# Patient Record
Sex: FEMALE
[De-identification: names, ages, dates, MRNs, and addresses within clinical notes are randomized per-mention and may not be internally consistent; named-entity substitution may affect disease eponyms.]

---

## 2020-05-16 ENCOUNTER — TRANSCRIPTION ENCOUNTER (OUTPATIENT)
Age: 35
End: 2020-05-16

## 2020-05-16 ENCOUNTER — INPATIENT (INPATIENT)
Facility: HOSPITAL | Age: 35
LOS: 0 days | Discharge: HOME | End: 2020-05-17
Attending: OBSTETRICS & GYNECOLOGY | Admitting: OBSTETRICS & GYNECOLOGY

## 2020-05-16 VITALS
DIASTOLIC BLOOD PRESSURE: 79 MMHG | SYSTOLIC BLOOD PRESSURE: 130 MMHG | HEART RATE: 116 BPM | RESPIRATION RATE: 20 BRPM | TEMPERATURE: 98 F

## 2020-05-16 DIAGNOSIS — O26.899 OTHER SPECIFIED PREGNANCY RELATED CONDITIONS, UNSPECIFIED TRIMESTER: ICD-10-CM

## 2020-05-16 DIAGNOSIS — Z3A.00 WEEKS OF GESTATION OF PREGNANCY NOT SPECIFIED: ICD-10-CM

## 2020-05-16 LAB
BLD GP AB SCN SERPL QL: NEGATIVE — SIGNIFICANT CHANGE UP
HCT VFR BLD CALC: 35.2 % — SIGNIFICANT CHANGE UP (ref 34.5–45)
HGB BLD-MCNC: 11.5 G/DL — SIGNIFICANT CHANGE UP (ref 11.5–15.5)
MCHC RBC-ENTMCNC: 28.6 PG — SIGNIFICANT CHANGE UP (ref 27–34)
MCHC RBC-ENTMCNC: 32.7 % — SIGNIFICANT CHANGE UP (ref 32–36)
MCV RBC AUTO: 87.6 FL — SIGNIFICANT CHANGE UP (ref 80–100)
NRBC # FLD: 0 K/UL — SIGNIFICANT CHANGE UP (ref 0–0)
PLATELET # BLD AUTO: 241 K/UL — SIGNIFICANT CHANGE UP (ref 150–400)
PMV BLD: 9.3 FL — SIGNIFICANT CHANGE UP (ref 7–13)
RBC # BLD: 4.02 M/UL — SIGNIFICANT CHANGE UP (ref 3.8–5.2)
RBC # FLD: 14.3 % — SIGNIFICANT CHANGE UP (ref 10.3–14.5)
RH IG SCN BLD-IMP: POSITIVE — SIGNIFICANT CHANGE UP
SARS-COV-2 RNA SPEC QL NAA+PROBE: SIGNIFICANT CHANGE UP
T PALLIDUM AB TITR SER: NEGATIVE — SIGNIFICANT CHANGE UP
WBC # BLD: 34.23 K/UL — HIGH (ref 3.8–10.5)
WBC # FLD AUTO: 34.23 K/UL — HIGH (ref 3.8–10.5)

## 2020-05-16 RX ORDER — MAGNESIUM HYDROXIDE 400 MG/1
30 TABLET, CHEWABLE ORAL
Refills: 0 | Status: DISCONTINUED | OUTPATIENT
Start: 2020-05-16 | End: 2020-05-17

## 2020-05-16 RX ORDER — OXYTOCIN 10 UNIT/ML
333.33 VIAL (ML) INJECTION
Qty: 20 | Refills: 0 | Status: DISCONTINUED | OUTPATIENT
Start: 2020-05-16 | End: 2020-05-17

## 2020-05-16 RX ORDER — DIBUCAINE 1 %
1 OINTMENT (GRAM) RECTAL EVERY 6 HOURS
Refills: 0 | Status: DISCONTINUED | OUTPATIENT
Start: 2020-05-16 | End: 2020-05-17

## 2020-05-16 RX ORDER — SODIUM CHLORIDE 9 MG/ML
1000 INJECTION, SOLUTION INTRAVENOUS
Refills: 0 | Status: DISCONTINUED | OUTPATIENT
Start: 2020-05-16 | End: 2020-05-16

## 2020-05-16 RX ORDER — IBUPROFEN 200 MG
1 TABLET ORAL
Qty: 0 | Refills: 0 | DISCHARGE
Start: 2020-05-16

## 2020-05-16 RX ORDER — IBUPROFEN 200 MG
600 TABLET ORAL EVERY 6 HOURS
Refills: 0 | Status: DISCONTINUED | OUTPATIENT
Start: 2020-05-16 | End: 2020-05-17

## 2020-05-16 RX ORDER — OXYCODONE HYDROCHLORIDE 5 MG/1
5 TABLET ORAL
Refills: 0 | Status: DISCONTINUED | OUTPATIENT
Start: 2020-05-16 | End: 2020-05-17

## 2020-05-16 RX ORDER — SODIUM CHLORIDE 9 MG/ML
1000 INJECTION, SOLUTION INTRAVENOUS ONCE
Refills: 0 | Status: COMPLETED | OUTPATIENT
Start: 2020-05-16 | End: 2020-05-16

## 2020-05-16 RX ORDER — DIPHENHYDRAMINE HCL 50 MG
25 CAPSULE ORAL EVERY 6 HOURS
Refills: 0 | Status: DISCONTINUED | OUTPATIENT
Start: 2020-05-16 | End: 2020-05-17

## 2020-05-16 RX ORDER — LANOLIN
1 OINTMENT (GRAM) TOPICAL EVERY 6 HOURS
Refills: 0 | Status: DISCONTINUED | OUTPATIENT
Start: 2020-05-16 | End: 2020-05-17

## 2020-05-16 RX ORDER — PRAMOXINE HYDROCHLORIDE 150 MG/15G
1 AEROSOL, FOAM RECTAL EVERY 4 HOURS
Refills: 0 | Status: DISCONTINUED | OUTPATIENT
Start: 2020-05-16 | End: 2020-05-17

## 2020-05-16 RX ORDER — KETOROLAC TROMETHAMINE 30 MG/ML
30 SYRINGE (ML) INJECTION ONCE
Refills: 0 | Status: DISCONTINUED | OUTPATIENT
Start: 2020-05-16 | End: 2020-05-16

## 2020-05-16 RX ORDER — OXYCODONE HYDROCHLORIDE 5 MG/1
5 TABLET ORAL ONCE
Refills: 0 | Status: DISCONTINUED | OUTPATIENT
Start: 2020-05-16 | End: 2020-05-17

## 2020-05-16 RX ORDER — TETANUS TOXOID, REDUCED DIPHTHERIA TOXOID AND ACELLULAR PERTUSSIS VACCINE, ADSORBED 5; 2.5; 8; 8; 2.5 [IU]/.5ML; [IU]/.5ML; UG/.5ML; UG/.5ML; UG/.5ML
0.5 SUSPENSION INTRAMUSCULAR ONCE
Refills: 0 | Status: DISCONTINUED | OUTPATIENT
Start: 2020-05-16 | End: 2020-05-17

## 2020-05-16 RX ORDER — BENZOCAINE 10 %
1 GEL (GRAM) MUCOUS MEMBRANE EVERY 6 HOURS
Refills: 0 | Status: DISCONTINUED | OUTPATIENT
Start: 2020-05-16 | End: 2020-05-17

## 2020-05-16 RX ORDER — HYDROCORTISONE 1 %
1 OINTMENT (GRAM) TOPICAL EVERY 6 HOURS
Refills: 0 | Status: DISCONTINUED | OUTPATIENT
Start: 2020-05-16 | End: 2020-05-17

## 2020-05-16 RX ORDER — SIMETHICONE 80 MG/1
80 TABLET, CHEWABLE ORAL EVERY 4 HOURS
Refills: 0 | Status: DISCONTINUED | OUTPATIENT
Start: 2020-05-16 | End: 2020-05-17

## 2020-05-16 RX ORDER — AER TRAVELER 0.5 G/1
1 SOLUTION RECTAL; TOPICAL EVERY 4 HOURS
Refills: 0 | Status: DISCONTINUED | OUTPATIENT
Start: 2020-05-16 | End: 2020-05-17

## 2020-05-16 RX ORDER — ACETAMINOPHEN 500 MG
3 TABLET ORAL
Qty: 0 | Refills: 0 | DISCHARGE
Start: 2020-05-16

## 2020-05-16 RX ORDER — SODIUM CHLORIDE 9 MG/ML
3 INJECTION INTRAMUSCULAR; INTRAVENOUS; SUBCUTANEOUS EVERY 8 HOURS
Refills: 0 | Status: DISCONTINUED | OUTPATIENT
Start: 2020-05-16 | End: 2020-05-17

## 2020-05-16 RX ORDER — ACETAMINOPHEN 500 MG
975 TABLET ORAL
Refills: 0 | Status: DISCONTINUED | OUTPATIENT
Start: 2020-05-16 | End: 2020-05-17

## 2020-05-16 RX ADMIN — Medication 1000 MILLIUNIT(S)/MIN: at 04:50

## 2020-05-16 RX ADMIN — SODIUM CHLORIDE 2000 MILLILITER(S): 9 INJECTION, SOLUTION INTRAVENOUS at 01:15

## 2020-05-16 RX ADMIN — SODIUM CHLORIDE 3 MILLILITER(S): 9 INJECTION INTRAMUSCULAR; INTRAVENOUS; SUBCUTANEOUS at 06:11

## 2020-05-16 RX ADMIN — Medication 30 MILLIGRAM(S): at 06:09

## 2020-05-16 RX ADMIN — SODIUM CHLORIDE 3 MILLILITER(S): 9 INJECTION INTRAMUSCULAR; INTRAVENOUS; SUBCUTANEOUS at 14:38

## 2020-05-16 NOTE — CHART NOTE - NSCHARTNOTEFT_GEN_A_CORE
R1 OB Labor Note    S: Patient seen and examined at bedside for pain    Vital Signs Last 24 Hrs  T(C): 36.7 (16 May 2020 02:02), Max: 36.9 (16 May 2020 00:52)  T(F): 98.1 (16 May 2020 02:02), Max: 98.5 (16 May 2020 00:52)  HR: 92 (16 May 2020 03:09) (85 - 123)  BP: 111/73 (16 May 2020 03:01) (107/69 - 143/65)  BP(mean): --  RR: 16 (16 May 2020 02:02) (16 - 20)  SpO2: 97% (16 May 2020 03:09) (91% - 100%)      SVE: 8.5/90/-2    A/P:   - Labor: expt mgmt  - Fetus: cat I tracing  - Pain: epidural to be placed    [place] PGY-1  d/w Dr. Oakley

## 2020-05-16 NOTE — OB PROVIDER TRIAGE NOTE - HISTORY OF PRESENT ILLNESS
33 yo female  @ 40wks c/o contractions every 5 minutes. denies vb or lof. reports +GFM. AP course uncomplicated thus far. denies fever chills ha n/v/d epigastric pain, new swelling, vision changes, cough, cp or sob. last seen by ob 5/15 1cm and sweep performed. EFW on 5/15 7#10.  GBS: negative  meds: PNV iron  ALL: ASA dizzy  PMH: denies  PSH: denies  gyn hx: denies  ob hx:  @ 40 wks 4/10/2018 7#10 uncomplicated  denies anxiety or depression

## 2020-05-16 NOTE — OB RN DELIVERY SUMMARY - NS_SEPSISRSKCALC_OBGYN_ALL_OB_FT
EOS calculated successfully. EOS Risk Factor: 0.5/1000 live births ([facility] national incidence); GA=40w;Temp=98.5; ROM=0.483; GBS='Unknown'; Antibiotics='No antibiotics or any antibiotics < 2 hrs prior to birth'

## 2020-05-16 NOTE — DISCHARGE NOTE OB - MEDICATION SUMMARY - MEDICATIONS TO TAKE
I will START or STAY ON the medications listed below when I get home from the hospital:    acetaminophen 325 mg oral tablet  -- 3 tab(s) by mouth   -- Indication: For pain    ibuprofen 600 mg oral tablet  -- 1 tab(s) by mouth every 6 hours  -- Indication: For pain    Prenatal Multivitamins with Folic Acid 1 mg oral tablet  -- 1 tab(s) by mouth once a day  -- Indication: For supplemental

## 2020-05-16 NOTE — OB PROVIDER H&P - ASSESSMENT
35 yo female  @ 40wks c/o contractions every 5 minutes. denies vb or lof. reports +GFM. AP course uncomplicated thus far. denies fever chills ha n/v/d epigastric pain, new swelling, vision changes, cough, cp or sob. last seen by ob 5/15 1cm and sweep performed. EFW on 5/15 7#10.    GBS: negative  abd: soft, gravid, NT  LS clear bilaterally  TAS: for position vertex  SVE: 6.5/90/-2 bulging membranes  FHT: moderate variability, + accels, negative decels, baseline 140  toco: every 4 min  Vital Signs Last 24 Hrs  T(C): 36.9 (16 May 2020 00:52), Max: 36.9 (16 May 2020 00:52)  T(F): 98.5 (16 May 2020 00:52), Max: 98.5 (16 May 2020 00:52)  HR: 116 (16 May 2020 00:58) (116 - 116)  BP: 130/79 (16 May 2020 00:58) (130/79 - 130/79)  BP(mean): --  RR: 20 (16 May 2020 00:52) (20 - 20)  SpO2: --  meds: PNV iron  ALL: ASA dizzy  PMH: denies  PSH: denies  gyn hx: denies  ob hx:  @ 40 wks 4/10/2018 7#10 uncomplicated  denies anxiety or depression    d/w Dr Oakley admit for labor @ 40 wks  for epidural for pain control  covid swab  admit labs

## 2020-05-16 NOTE — OB PROVIDER DELIVERY SUMMARY - NSPROVIDERDELIVERYNOTE_OBGYN_ALL_OB_FT
pt became fd and pushed to deliver a liveborn female apgars 9/9  placenta delivered spontaneously with 3vc  laceration repaired with 2-0 chromic

## 2020-05-16 NOTE — OB PROVIDER TRIAGE NOTE - NSOBPROVIDERNOTE_OBGYN_ALL_OB_FT
33 yo female  @ 40wks c/o contractions every 5 minutes. denies vb or lof. reports +GFM. AP course uncomplicated thus far. denies fever chills ha n/v/d epigastric pain, new swelling, vision changes, cough, cp or sob. last seen by ob 5/15 1cm and sweep performed. EFW on 5/15 7#10.    GBS: negative  abd: soft, gravid, NT  LS clear bilaterally  TAS: for position vertex  SVE: 6.5/90/-2 bulging membranes  FHT: moderate variability, + accels, negative decels, baseline 140  toco: every 4 min  Vital Signs Last 24 Hrs  T(C): 36.9 (16 May 2020 00:52), Max: 36.9 (16 May 2020 00:52)  T(F): 98.5 (16 May 2020 00:52), Max: 98.5 (16 May 2020 00:52)  HR: 116 (16 May 2020 00:58) (116 - 116)  BP: 130/79 (16 May 2020 00:58) (130/79 - 130/79)  BP(mean): --  RR: 20 (16 May 2020 00:52) (20 - 20)  SpO2: --  meds: PNV iron  ALL: ASA dizzy  PMH: denies  PSH: denies  gyn hx: denies  ob hx:  @ 40 wks 4/10/2018 7#10 uncomplicated  denies anxiety or depression    d/w Dr Oakley admit for labor @ 40 wks  for epidural for pain control  covid swab  admit labs

## 2020-05-16 NOTE — OB PROVIDER TRIAGE NOTE - NSHPPHYSICALEXAM_GEN_ALL_CORE
35 yo female  @ 40wks c/o contractions every 5 minutes. denies vb or lof. reports +GFM. AP course uncomplicated thus far. denies fever chills ha n/v/d epigastric pain, new swelling, vision changes, cough, cp or sob. last seen by ob 5/15 1cm and sweep performed. EFW on 5/15 7#10.    GBS: negative  abd: soft, gravid, NT  LS clear bilaterally  TAS: for position vertex  SVE: 6.5/90/-2 bulging membranes  FHT: moderate variability, + accels,   toco: every 4 min  Vital Signs Last 24 Hrs  T(C): 36.9 (16 May 2020 00:52), Max: 36.9 (16 May 2020 00:52)  T(F): 98.5 (16 May 2020 00:52), Max: 98.5 (16 May 2020 00:52)  HR: 116 (16 May 2020 00:58) (116 - 116)  BP: 130/79 (16 May 2020 00:58) (130/79 - 130/79)  BP(mean): --  RR: 20 (16 May 2020 00:52) (20 - 20)  SpO2: --  meds: PNV iron  ALL: ASA dizzy  PMH: denies  PSH: denies  gyn hx: denies  ob hx:  @ 40 wks 4/10/2018 7#10 uncomplicated  denies anxiety or depression

## 2020-05-16 NOTE — OB PROVIDER H&P - HISTORY OF PRESENT ILLNESS
35 yo female  @ 40wks c/o contractions every 5 minutes. denies vb or lof. reports +GFM. AP course uncomplicated thus far. denies fever chills ha n/v/d epigastric pain, new swelling, vision changes, cough, cp or sob. last seen by ob 5/15 1cm and sweep performed. EFW on 5/15 7#10.  GBS: negative  meds: PNV iron  ALL: ASA dizzy  PMH: denies  PSH: denies  gyn hx: denies  ob hx:  @ 40 wks 4/10/2018 7#10 uncomplicated  denies anxiety or depression

## 2020-05-16 NOTE — DISCHARGE NOTE OB - PATIENT PORTAL LINK FT
You can access the FollowMyHealth Patient Portal offered by [facility] by registering at the following website: [email or web address]. By joining [facility]’s FollowMyHealth portal, you will also be able to view your health information using other applications (apps) compatible with our system.

## 2020-05-16 NOTE — DISCHARGE NOTE OB - CARE PROVIDER_API CALL
Jacki Gonzalez  OBSTETRICS AND GYNECOLOGY  [address]  [place] [address]  Phone: [phone]  Fax: [phone]  Established Patient  Follow Up Time: Routine

## 2020-05-17 VITALS
SYSTOLIC BLOOD PRESSURE: 121 MMHG | OXYGEN SATURATION: 100 % | TEMPERATURE: 98 F | RESPIRATION RATE: 18 BRPM | HEART RATE: 93 BPM | DIASTOLIC BLOOD PRESSURE: 74 MMHG

## 2020-05-17 NOTE — PROGRESS NOTE ADULT - SUBJECTIVE AND OBJECTIVE BOX
S: Patient doing well. Minimal lochia. Pain controlled.    O: Vital Signs Last 24 Hrs  T(C): 36.7 (17 May 2020 05:40), Max: 37.1 (16 May 2020 14:11)  T(F): 98 (17 May 2020 05:40), Max: 98.8 (16 May 2020 14:11)  HR: 93 (17 May 2020 05:40) (85 - 95)  BP: 121/74 (17 May 2020 05:40) (118/68 - 130/66)  BP(mean): --  RR: 18 (17 May 2020 05:40) (18 - 18)  SpO2: 100% (17 May 2020 05:40) (99% - 100%)    Gen: NAD  Abd: soft, NT, ND, fundus firm below umbilicus  Lochia: moderate  Ext: no tenderness    Labs:                        11.5   34.23 )-----------( 241      ( 16 May 2020 05:30 )             35.2       A: 34y PPD# s/p  doing well.    Plan: discharge to home

## 2024-01-05 NOTE — OB RN PATIENT PROFILE - MMR / RUBELLA IMMUNIZATION DATE
Take medrol dose pack with food.      Tylenol as needed.     Rest and ice/heat and topical agents as needed.     Return if symptoms fail to improve    childhood

## 2025-03-04 NOTE — OB PROVIDER H&P - CENTRAL VENOUS CATHETER
[EKG obtained to assist in diagnosis and management of assessed problem(s)] : EKG obtained to assist in diagnosis and management of assessed problem(s)
[EKG obtained to assist in diagnosis and management of assessed problem(s)] : EKG obtained to assist in diagnosis and management of assessed problem(s)
no